# Patient Record
Sex: FEMALE | Race: WHITE | NOT HISPANIC OR LATINO | URBAN - METROPOLITAN AREA
[De-identification: names, ages, dates, MRNs, and addresses within clinical notes are randomized per-mention and may not be internally consistent; named-entity substitution may affect disease eponyms.]

---
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
## 2024-04-19 DIAGNOSIS — Z00.00 HEALTH CARE MAINTENANCE: ICD-10-CM

## 2024-04-19 RX ORDER — NORGESTREL AND ETHINYL ESTRADIOL 0.3-0.03MG
1 KIT ORAL DAILY
Qty: 28 TABLET | Refills: 11 | Status: SHIPPED | OUTPATIENT
Start: 2024-04-19

## 2024-11-28 ENCOUNTER — OFFICE VISIT (OUTPATIENT)
Dept: URGENT CARE | Age: 19
End: 2024-11-28
Payer: COMMERCIAL

## 2024-11-28 VITALS
WEIGHT: 127 LBS | HEART RATE: 91 BPM | DIASTOLIC BLOOD PRESSURE: 87 MMHG | BODY MASS INDEX: 23.37 KG/M2 | RESPIRATION RATE: 16 BRPM | SYSTOLIC BLOOD PRESSURE: 146 MMHG | HEIGHT: 62 IN | TEMPERATURE: 98.7 F | OXYGEN SATURATION: 99 %

## 2024-11-28 DIAGNOSIS — J02.9 PHARYNGITIS, UNSPECIFIED ETIOLOGY: Primary | ICD-10-CM

## 2024-11-28 DIAGNOSIS — J02.9 SORE THROAT: ICD-10-CM

## 2024-11-28 LAB — POC RAPID STREP: NEGATIVE

## 2024-11-28 NOTE — PROGRESS NOTES
Subjective   Patient ID: Kelsey Avila is a 19 y.o. female. They present today with a chief complaint of No chief complaint on file..    History of Present Illness  Kelsey is a 19-year-old female who presents to the urgent care for evaluation of sore throat and nasal congestion with postnasal drip and rule out of strep infection.  Patient denies body aches, fatigue or cough.  Patient states she attempted some over-the-counter remedies without significant proved of symptoms.  Patient's main concern is ruling out strep prior to the holidays.  No other symptoms or concerns otherwise reported.    Past Medical History  Allergies as of 11/28/2024    (No Known Allergies)       (Not in a hospital admission)       Past Medical History:   Diagnosis Date    Acute atopic conjunctivitis, unspecified eye 08/24/2020    Conjunctivitis, allergic    Acute upper respiratory infection, unspecified 03/03/2022    Acute URI    Body mass index (BMI) pediatric, 5th percentile to less than 85th percentile for age 08/09/2021    BMI (body mass index), pediatric, 5% to less than 85% for age    Body mass index (BMI) pediatric, 85th percentile to less than 95th percentile for age 08/07/2020    BMI (body mass index), pediatric, 85% to less than 95% for age    Cellulitis of left toe 02/17/2017    Infection of nailbed of toe of left foot    Contusion of unspecified finger without damage to nail, initial encounter 05/05/2016    Finger contusion    Encounter for routine child health examination without abnormal findings 08/09/2021    [facility] (well child check)    Encounter for routine child health examination without abnormal findings 08/09/2021    Encounter for routine child health examination without abnormal findings    Exercise induced bronchospasm ([facility]) 08/07/2017    Asthma, exercise induced    Generalized anxiety disorder 08/16/2021    SHELLEY (generalized anxiety disorder)    Ingrowing nail 04/02/2018    Ingrown toenail of right foot with  infection    Local infection of the skin and subcutaneous tissue, unspecified 08/16/2018    Skin infection    Melena 05/19/2021    Blood in stool    Other specified abnormal findings of blood chemistry 09/15/2016    Abnormal TSH    Otitis media, unspecified, bilateral 03/14/2016    Acute bilateral otitis media    Pain in right ankle and joints of right foot 06/06/2022    Ankle pain, right    Personal history of other diseases of the digestive system 05/19/2021    History of constipation    Personal history of other diseases of the nervous system and sense organs 12/21/2018    History of ear pain    Personal history of other diseases of the respiratory system 03/03/2018    History of streptococcal pharyngitis    Personal history of other diseases of the respiratory system 01/06/2017    History of acute sinusitis    Personal history of other diseases of the respiratory system 03/14/2016    History of streptococcal pharyngitis    Personal history of other diseases of the respiratory system 03/03/2022    History of pharyngitis    Personal history of other diseases of the respiratory system 03/07/2022    History of tonsillitis    Personal history of other diseases of the respiratory system 03/08/2016    History of acute sinusitis    Personal history of other endocrine, nutritional and metabolic disease 05/19/2021    History of Hashimoto thyroiditis    Personal history of other endocrine, nutritional and metabolic disease 02/26/2018    History of vitamin D deficiency    Personal history of other endocrine, nutritional and metabolic disease 08/09/2021    History of nutritional deficiency    Personal history of other endocrine, nutritional and metabolic disease 04/22/2016    History of thyroid nodule    Personal history of other endocrine, nutritional and metabolic disease 09/12/2017    History of goiter    Personal history of other specified conditions     History of dysuria    Personal history of other specified  "conditions 07/14/2021    History of dysuria    Personal history of other specified conditions 09/17/2018    History of exertional chest pain    Personal history of other specified conditions     History of fever    Sprain of unspecified ligament of right ankle, initial encounter 07/14/2022    Right ankle sprain    Strain of muscle, fascia and tendon at neck level, initial encounter 10/01/2015    Acute strain of neck muscle    Streptococcal pharyngitis 11/13/2018    Strep throat    Unspecified injury of right wrist, hand and finger(s), initial encounter 06/03/2022    Hand injury, right, initial encounter    Unspecified nonsuppurative otitis media, unspecified ear 12/21/2018    MICHELLE (middle ear effusion)       No past surgical history on file.     reports that she has never smoked. She has never used smokeless tobacco.    Review of Systems  A 10-point review of systems was performed, otherwise unremarkable unless stated in the history of present illness.                Objective    Vitals:    11/28/24 1052   BP: 146/87   Pulse: 91   Resp: 16   Temp: 37.1 °C (98.7 °F)   SpO2: 99%   Weight: 57.6 kg (127 lb)   Height: 1.575 m (5' 2\")     Patient's last menstrual period was 11/01/2024 (approximate).    Gen: Vitals noted and reviewed, no evidence of acute distress, well developed and afebrile.   Psych: Mood and affect appropriate for setting.  Head/Face: Atraumatic and normocephalic.   Neuro: No focal deficits noted.  ENT: TMs clear bilaterally, EACs unremarkable. Mastoids non-tender. Posterior oropharynx with erythema, without exudate, or swelling. Uvula is in the midline and non-edematous.   Neck: Supple. No meningismus through full range of motion. +b/l cervical lymphadenopathy.   Cardiac: Regular rate and rhythm no murmur.   Lungs: Clear to auscultation throughout, No evidence of wheezing, rhonchi or crackles. Good aeration throughout.   Extremities: Symmetrical, No peripheral edema  Skin: Without evidence of " ecchymosis, wounds, or rashes.      Point of Care Test & Imaging Results from this visit  Results for orders placed or performed in visit on 11/28/24   POCT rapid strep A manually resulted   Result Value Ref Range    POC Rapid Strep Negative Negative      No results found.    Diagnostic study results (if any) were reviewed by Nubia Gonzalez DO.    Assessment/Plan   Allergies, medications, history, and pertinent labs/EKGs/Imaging reviewed by Nubia Gonzalez DO.     Medical Decision Making  Discussed with the patient symptoms and clinical presentation findings suggestive of an acute pharyngitis likely secondary virus of unclear etiology.  We advise close symptom monitoring supportive treatment.  We advised use of over-the-counter remedies for added symptom relief.  We did offer mono testing and strep PCR send out in the clinic today however the patient declined. Follow up with PCP. We advised seeking immediate emergency medical attention if symptoms fail to improve, worsen or any concerning symptoms arise. Patient voiced full understanding and agreement to plan.      Orders and Diagnoses  Diagnoses and all orders for this visit:  Sore throat  -     POCT rapid strep A manually resulted      Medical Admin Record      Patient disposition: Home    Electronically signed by Nubia Gonzalez DO  11:20 AM

## 2025-01-30 ENCOUNTER — APPOINTMENT (OUTPATIENT)
Dept: PRIMARY CARE | Facility: CLINIC | Age: 20
End: 2025-01-30
Payer: COMMERCIAL

## 2025-01-30 VITALS
TEMPERATURE: 97.5 F | SYSTOLIC BLOOD PRESSURE: 118 MMHG | DIASTOLIC BLOOD PRESSURE: 68 MMHG | HEART RATE: 123 BPM | BODY MASS INDEX: 23.74 KG/M2 | OXYGEN SATURATION: 98 % | HEIGHT: 63 IN | WEIGHT: 134 LBS

## 2025-01-30 DIAGNOSIS — Z30.41 ENCOUNTER FOR SURVEILLANCE OF CONTRACEPTIVE PILLS: ICD-10-CM

## 2025-01-30 DIAGNOSIS — Z86.39 HISTORY OF VITAMIN D DEFICIENCY: ICD-10-CM

## 2025-01-30 DIAGNOSIS — L30.9 ECZEMA, UNSPECIFIED TYPE: ICD-10-CM

## 2025-01-30 DIAGNOSIS — Z00.00 ANNUAL PHYSICAL EXAM: Primary | ICD-10-CM

## 2025-01-30 DIAGNOSIS — Z80.8 FAMILY HISTORY OF THYROID CANCER: ICD-10-CM

## 2025-01-30 DIAGNOSIS — Z13.0 SCREENING FOR IRON DEFICIENCY ANEMIA: ICD-10-CM

## 2025-01-30 PROCEDURE — 99395 PREV VISIT EST AGE 18-39: CPT | Performed by: NURSE PRACTITIONER

## 2025-01-30 PROCEDURE — 3008F BODY MASS INDEX DOCD: CPT | Performed by: NURSE PRACTITIONER

## 2025-01-30 PROCEDURE — 1036F TOBACCO NON-USER: CPT | Performed by: NURSE PRACTITIONER

## 2025-01-30 PROCEDURE — 99213 OFFICE O/P EST LOW 20 MIN: CPT | Performed by: NURSE PRACTITIONER

## 2025-01-30 RX ORDER — NORGESTREL AND ETHINYL ESTRADIOL 0.3-0.03MG
1 KIT ORAL DAILY
Qty: 28 TABLET | Refills: 11 | Status: SHIPPED | OUTPATIENT
Start: 2025-01-30

## 2025-01-30 RX ORDER — TRIAMCINOLONE ACETONIDE 1 MG/G
CREAM TOPICAL 2 TIMES DAILY
Qty: 15 G | Refills: 0 | Status: SHIPPED | OUTPATIENT
Start: 2025-01-30

## 2025-01-30 ASSESSMENT — PATIENT HEALTH QUESTIONNAIRE - PHQ9
1. LITTLE INTEREST OR PLEASURE IN DOING THINGS: NOT AT ALL
2. FEELING DOWN, DEPRESSED OR HOPELESS: NOT AT ALL
SUM OF ALL RESPONSES TO PHQ9 QUESTIONS 1 AND 2: 0

## 2025-01-30 NOTE — PROGRESS NOTES
"Subjective   Patient ID: Kelsey Avila is a 19 y.o. female who presents for Establish Care.      HPI  Kelsey 19-year-old female here to \[facility] [place] [facility]\"" care.  Would like annual blood draw nonfasting.  Does need refill on her birth control she does complain of some eczema rashes on her face on her arms.  Worse in the winter.    No pertinent medical history.  Surgical History  Problems    · Denied: History Of Prior Surgery     Family History  Mother    · Family history of eczema (V19.4) (Z84.0)   · Family history of thyroid disease (V18.19) (Z83.49)  Father    · No pertinent family history  Sister    · Family history of malignant neoplasm of thyroid (V16.8) (Z80.8)   · Family history of thyroid disease (V18.19) (Z83.49)  Maternal Grandmother    · Family history of malignant neoplasm of thyroid (V16.8) (Z80.8)  Maternal Great Grandmother    · Family history of hypercholesterolemia (V18.19) (Z83.42)   · Family history of hypertension (V17.49) (Z82.49)   · Family history of malignant neoplasm of thyroid (V16.8) (Z80.8)  Maternal Aunt    · Family history of celiac disease (V18.59) (Z83.79)   · Family history of migraine headaches (V17.2) (Z82.0)   · Family history of thyroid disease (V18.19) (Z83.49)       Eating and drinking okay moving bowels and bladder okay does not drink or smoke.  History of vitamin D deficiency and anxiety.  States she does feel tired at times.  Will recheck vitamin D levels and iron levels due to she is a vegetarian.    Review of Systems   Skin:  Positive for rash.       Objective   /68   Pulse (!) 123   Temp 36.4 °C (97.5 °F)   Ht 1.6 m (5' 3\")   Wt 60.8 kg (134 lb)   SpO2 98%   BMI 23.74 kg/m²     Physical Exam  Vitals and nursing note reviewed.   Constitutional:       Appearance: Normal appearance. She is normal weight.   HENT:      Head: Normocephalic.      Nose: Nose normal.      Mouth/Throat:      Mouth: Mucous membranes are moist.   Eyes:      Extraocular Movements: Extraocular " movements intact.      Conjunctiva/sclera: Conjunctivae normal.      Pupils: Pupils are equal, round, and reactive to light.   Cardiovascular:      Rate and Rhythm: Normal rate and regular rhythm.      Pulses: Normal pulses.      Heart sounds: Normal heart sounds.   Pulmonary:      Effort: Pulmonary effort is normal.   Abdominal:      General: Abdomen is flat. Bowel sounds are normal.      Palpations: Abdomen is soft.   Musculoskeletal:         General: Normal range of motion.      Cervical back: Normal range of motion.   Skin:     General: Skin is warm and dry.   Neurological:      General: No focal deficit present.      Mental Status: She is alert and oriented to person, place, and time.   Psychiatric:         Mood and Affect: Mood normal.         Behavior: Behavior normal.         Assessment/Plan   1. Annual physical exam (Primary)  - Hemoglobin A1C; Future  - CBC; Future  - Comprehensive Metabolic Panel; Future  - Lipid Panel; Future  - TSH with reflex to Free T4 if abnormal; Future  - Hemoglobin A1C  - CBC  - Comprehensive Metabolic Panel  - Lipid Panel  - TSH with reflex to Free T4 if abnormal    3. History of vitamin D deficiency  - Vitamin D 25-Hydroxy,Total (for eval of Vitamin D levels); Future  - Vitamin D 25-Hydroxy,Total (for eval of Vitamin D levels)    4. Family history of thyroid cancer  - TSH with reflex to Free T4 if abnormal; Future  - Triiodothyronine, Total; Future  - TSH with reflex to Free T4 if abnormal  - Triiodothyronine, Total    5. Eczema, unspecified type  - Food Allergy Profile IgE; Future  - Respiratory Allergy Profile IgE; Future  - triamcinolone (Kenalog) 0.1 % cream; Apply topically 2 times a day. Apply to affected area 1-2 times daily as needed.  Dispense: 15 g; Refill: 0  - Respiratory Allergy Profile IgE    6. Encounter for surveillance of contraceptive pills  - norgestrel-ethinyl estradioL (Elinest) 0.3-30 mg-mcg tablet; Take 1 tablet by mouth once daily.  Dispense: 28 tablet;  Refill: 11    7. Screening for iron deficiency anemia  - Iron and TIBC; Future  - Ferritin; Future  - Iron and TIBC  - Ferritin

## 2025-02-05 LAB
25(OH)D3+25(OH)D2 SERPL-MCNC: 29 NG/ML (ref 30–100)
A ALTERNATA IGE QN: <0.1 KU/L
A ALTERNATA IGE RAST: 0
A FUMIGATUS IGE QN: <0.1 KU/L
A FUMIGATUS IGE RAST: 0
ALBUMIN SERPL-MCNC: 3.9 G/DL (ref 3.6–5.1)
ALP SERPL-CCNC: 45 U/L (ref 31–125)
ALT SERPL-CCNC: 22 U/L (ref 6–29)
ANION GAP SERPL CALCULATED.4IONS-SCNC: 8 MMOL/L (CALC) (ref 7–17)
AST SERPL-CCNC: 19 U/L (ref 10–30)
BERMUDA GRASS IGE QN: <0.1 KU/L
BERMUDA GRASS IGE RAST: 0
BILIRUB SERPL-MCNC: 0.5 MG/DL (ref 0.2–1.2)
BOXELDER IGE QN: <0.1 KU/L
BOXELDER IGE RAST: 0
BUN SERPL-MCNC: 6 MG/DL (ref 7–25)
C HERBARUM IGE QN: <0.1 KU/L
C HERBARUM IGE RAST: 0
CALCIUM SERPL-MCNC: 8.5 MG/DL (ref 8.6–10.2)
CALIF WALNUT POLN IGE QN: <0.1 KU/L
CALIF WALNUT POLN IGE RAST: 0
CAT DANDER IGE QN: <0.1 KU/L
CAT DANDER IGE RAST: 0
CHLORIDE SERPL-SCNC: 107 MMOL/L (ref 98–110)
CHOLEST SERPL-MCNC: 142 MG/DL
CHOLEST/HDLC SERPL: 3.6 (CALC)
CMN PIGWEED IGE QN: <0.1 KU/L
CMN PIGWEED IGE RAST: 0
CO2 SERPL-SCNC: 26 MMOL/L (ref 20–32)
COMMON RAGWEED IGE QN: <0.1 KU/L
COMMON RAGWEED IGE RAST: 0
COTTONWOOD IGE QN: <0.1 KU/L
COTTONWOOD IGE RAST: 0
CREAT SERPL-MCNC: 0.56 MG/DL (ref 0.5–0.96)
D FARINAE IGE QN: <0.1 KU/L
D FARINAE IGE RAST: 0
D PTERONYSS IGE QN: <0.1 KU/L
D PTERONYSS IGE RAST: 0
DOG DANDER IGE QN: <0.1 KU/L
DOG DANDER IGE RAST: 0
EGFRCR SERPLBLD CKD-EPI 2021: 134 ML/MIN/1.73M2
ERYTHROCYTE [DISTWIDTH] IN BLOOD BY AUTOMATED COUNT: 12.5 % (ref 11–15)
EST. AVERAGE GLUCOSE BLD GHB EST-MCNC: 108 MG/DL
EST. AVERAGE GLUCOSE BLD GHB EST-SCNC: 6 MMOL/L
FERRITIN SERPL-MCNC: 24 NG/ML (ref 16–154)
GLUCOSE SERPL-MCNC: 89 MG/DL (ref 65–99)
HBA1C MFR BLD: 5.4 % OF TOTAL HGB
HCT VFR BLD AUTO: 37.7 % (ref 35–45)
HDLC SERPL-MCNC: 40 MG/DL
HGB BLD-MCNC: 12.5 G/DL (ref 11.7–15.5)
IGE SERPL-ACNC: 33 KU/L
IRON SATN MFR SERPL: 27 % (CALC) (ref 16–45)
IRON SERPL-MCNC: 106 MCG/DL (ref 40–190)
LDLC SERPL CALC-MCNC: 79 MG/DL (CALC)
LONDON PLANE IGE QN: <0.1 KU/L
LONDON PLANE IGE RAST: 0
MCH RBC QN AUTO: 29 PG (ref 27–33)
MCHC RBC AUTO-ENTMCNC: 33.2 G/DL (ref 32–36)
MCV RBC AUTO: 87.5 FL (ref 80–100)
MOUSE URINE PROT IGE QN: <0.1 KU/L
MOUSE URINE PROT IGE RAST: 0
MT JUNIPER IGE QN: <0.1 KU/L
MT JUNIPER IGE RAST: 0
NONHDLC SERPL-MCNC: 102 MG/DL (CALC)
P NOTATUM IGE QN: <0.1 KU/L
P NOTATUM IGE RAST: 0
PECAN/HICK TREE IGE QN: <0.1 KU/L
PECAN/HICK TREE IGE RAST: 0
PLATELET # BLD AUTO: 227 THOUSAND/UL (ref 140–400)
PMV BLD REES-ECKER: 9.9 FL (ref 7.5–12.5)
POTASSIUM SERPL-SCNC: 4.6 MMOL/L (ref 3.5–5.3)
PROT SERPL-MCNC: 6.5 G/DL (ref 6.1–8.1)
RBC # BLD AUTO: 4.31 MILLION/UL (ref 3.8–5.1)
REF LAB TEST REF RANGE: NORMAL
ROACH IGE QN: <0.1 KU/L
ROACH IGE RAST: 0
SALTWORT IGE QN: <0.1 KU/L
SALTWORT IGE RAST: 0
SHEEP SORREL IGE QN: <0.1 KU/L
SHEEP SORREL IGE RAST: 0
SILVER BIRCH IGE QN: <0.1 KU/L
SILVER BIRCH IGE RAST: 0
SODIUM SERPL-SCNC: 141 MMOL/L (ref 135–146)
T3 SERPL-MCNC: 193 NG/DL (ref 86–192)
TIBC SERPL-MCNC: 391 MCG/DL (CALC) (ref 250–450)
TIMOTHY IGE QN: <0.1 KU/L
TIMOTHY IGE RAST: 0
TRIGL SERPL-MCNC: 125 MG/DL
TSH SERPL-ACNC: 2.72 MIU/L
WBC # BLD AUTO: 8.4 THOUSAND/UL (ref 3.8–10.8)
WHITE ASH IGE QN: <0.1 KU/L
WHITE ASH IGE RAST: 0
WHITE ELM IGE QN: <0.1 KU/L
WHITE ELM IGE RAST: 0
WHITE MULBERRY IGE QN: <0.1 KU/L
WHITE MULBERRY IGE RAST: 0
WHITE OAK IGE QN: <0.1 KU/L
WHITE OAK IGE RAST: 0

## 2025-02-07 DIAGNOSIS — R05.1 ACUTE COUGH: Primary | ICD-10-CM

## 2025-02-07 DIAGNOSIS — E55.9 VITAMIN D DEFICIENCY DISEASE: ICD-10-CM

## 2025-02-07 DIAGNOSIS — J01.10 ACUTE NON-RECURRENT FRONTAL SINUSITIS: ICD-10-CM

## 2025-02-07 RX ORDER — METHYLPREDNISOLONE 4 MG/1
TABLET ORAL
Qty: 21 TABLET | Refills: 0 | Status: SHIPPED | OUTPATIENT
Start: 2025-02-07 | End: 2025-02-13

## 2025-02-07 RX ORDER — AMOXICILLIN AND CLAVULANATE POTASSIUM 875; 125 MG/1; MG/1
875 TABLET, FILM COATED ORAL 2 TIMES DAILY
Qty: 20 TABLET | Refills: 0 | Status: SHIPPED | OUTPATIENT
Start: 2025-02-07 | End: 2025-02-17

## 2025-02-09 RX ORDER — ERGOCALCIFEROL 1.25 MG/1
50000 CAPSULE ORAL WEEKLY
Qty: 12 CAPSULE | Refills: 0 | Status: SHIPPED | OUTPATIENT
Start: 2025-02-09 | End: 2025-05-04